# Patient Record
Sex: FEMALE | Race: WHITE | Employment: OTHER | URBAN - METROPOLITAN AREA
[De-identification: names, ages, dates, MRNs, and addresses within clinical notes are randomized per-mention and may not be internally consistent; named-entity substitution may affect disease eponyms.]

---

## 2018-07-13 ENCOUNTER — HOSPITAL ENCOUNTER (OUTPATIENT)
Dept: INFUSION THERAPY | Age: 68
Discharge: HOME | End: 2018-07-13
Payer: MEDICARE

## 2018-07-13 VITALS
TEMPERATURE: 97.9 F | DIASTOLIC BLOOD PRESSURE: 72 MMHG | OXYGEN SATURATION: 100 % | HEART RATE: 81 BPM | SYSTOLIC BLOOD PRESSURE: 139 MMHG | RESPIRATION RATE: 18 BRPM

## 2018-07-13 PROCEDURE — 96365 THER/PROPH/DIAG IV INF INIT: CPT

## 2018-07-13 PROCEDURE — 74011000258 HC RX REV CODE- 258

## 2018-07-13 PROCEDURE — 74011250636 HC RX REV CODE- 250/636

## 2018-07-13 RX ORDER — LEVOTHYROXINE SODIUM 112 UG/1
TABLET ORAL
COMMUNITY

## 2018-07-13 RX ORDER — PREDNISONE 5 MG/1
15 TABLET ORAL DAILY
COMMUNITY

## 2018-07-13 RX ORDER — PANTOPRAZOLE SODIUM 20 MG/1
20 TABLET, DELAYED RELEASE ORAL DAILY
COMMUNITY

## 2018-07-13 RX ORDER — FUROSEMIDE 80 MG/1
80 TABLET ORAL DAILY
COMMUNITY

## 2018-07-13 RX ORDER — LACTULOSE 10 G/15ML
30 SOLUTION ORAL; RECTAL DAILY
COMMUNITY

## 2018-07-13 RX ORDER — WARFARIN SODIUM 5 MG/1
5 TABLET ORAL EVERY EVENING
COMMUNITY

## 2018-07-13 RX ORDER — SPIRONOLACTONE 100 MG/1
100 TABLET, FILM COATED ORAL 2 TIMES DAILY
COMMUNITY

## 2018-07-13 RX ORDER — MYCOPHENOLATE MOFETIL 500 MG/1
1000 TABLET ORAL 2 TIMES DAILY
COMMUNITY

## 2018-07-13 RX ADMIN — SODIUM CHLORIDE 1 G: 900 INJECTION, SOLUTION INTRAVENOUS at 13:30

## 2018-07-13 NOTE — PROGRESS NOTES
1310 Pt arrived via wheelchair and in no distress for Ertepenem. Assessment complete. Pt reports overall weakness, especially BLE. Dual lumen PICC line in right upper arm, both lumens with brisk blood return. Patient Vitals for the past 12 hrs:   Temp Pulse Resp BP SpO2   07/13/18 1317 97.9 °F (36.6 °C) 81 18 139/72 100 %     Medications:  Ertepenem 1 gram IV    1400 Discharged home via whelchair and in no distress, accompanied by . Next appointment 07/14/18 @ [facility].

## 2018-07-14 ENCOUNTER — HOSPITAL ENCOUNTER (OUTPATIENT)
Dept: INFUSION THERAPY | Age: 68
Discharge: HOME | End: 2018-07-14
Payer: MEDICARE

## 2018-07-14 VITALS
HEART RATE: 83 BPM | DIASTOLIC BLOOD PRESSURE: 75 MMHG | TEMPERATURE: 98.6 F | SYSTOLIC BLOOD PRESSURE: 140 MMHG | RESPIRATION RATE: 18 BRPM

## 2018-07-14 PROCEDURE — 96365 THER/PROPH/DIAG IV INF INIT: CPT

## 2018-07-14 PROCEDURE — 74011000258 HC RX REV CODE- 258

## 2018-07-14 PROCEDURE — 74011250636 HC RX REV CODE- 250/636

## 2018-07-14 RX ORDER — SODIUM CHLORIDE 0.9 % (FLUSH) 0.9 %
10-40 SYRINGE (ML) INJECTION AS NEEDED
Status: ACTIVE | OUTPATIENT
Start: 2018-07-14 | End: 2018-07-15

## 2018-07-14 RX ORDER — HEPARIN 100 UNIT/ML
500 SYRINGE INTRAVENOUS AS NEEDED
Status: ACTIVE | OUTPATIENT
Start: 2018-07-14 | End: 2018-07-15

## 2018-07-14 RX ADMIN — SODIUM CHLORIDE 1 G: 900 INJECTION, SOLUTION INTRAVENOUS at 12:04

## 2018-07-14 RX ADMIN — Medication 30 ML: at 12:03

## 2018-07-14 RX ADMIN — SODIUM CHLORIDE, PRESERVATIVE FREE 500 UNITS: 5 INJECTION INTRAVENOUS at 12:33

## 2018-07-14 RX ADMIN — Medication 10 ML: at 12:33

## 2018-07-14 NOTE — PROGRESS NOTES
Pt arrived to [facility] ambulatory in no acute distress at 1200.  Assessment unremarkable. R arm PICC flushed without issue and positive blood return noted in each lumen. Visit Vitals    /75 (BP 1 Location: Left arm, BP Patient Position: Sitting)    Pulse 83    Temp 98.6 °F (37 °C)    Resp 18       The following medications administered:  Invanz IV over 30 minutes    Pt tolerated treatment well. PICC flushed per policy and new green caps placed.  Pt discharged ambulatory in no acute distress at 1235, accompanied by spouse. Next appointment 7/15/18 at 1200.

## 2018-07-15 ENCOUNTER — HOSPITAL ENCOUNTER (OUTPATIENT)
Dept: INFUSION THERAPY | Age: 68
Discharge: HOME | End: 2018-07-15
Payer: MEDICARE

## 2018-07-15 VITALS
RESPIRATION RATE: 18 BRPM | TEMPERATURE: 97.1 F | SYSTOLIC BLOOD PRESSURE: 144 MMHG | DIASTOLIC BLOOD PRESSURE: 80 MMHG | HEART RATE: 92 BPM

## 2018-07-15 PROCEDURE — 96365 THER/PROPH/DIAG IV INF INIT: CPT

## 2018-07-15 PROCEDURE — 74011000258 HC RX REV CODE- 258

## 2018-07-15 PROCEDURE — 74011250636 HC RX REV CODE- 250/636

## 2018-07-15 RX ADMIN — SODIUM CHLORIDE 1 G: 900 INJECTION, SOLUTION INTRAVENOUS at 11:40

## 2018-07-15 NOTE — PROGRESS NOTES
Outpatient Infusion Center Progress Note    1130 Pt admit to [facility] for Daily Invanz ambulatory with walker, in stable condition. Assessment completed. No new concerns voiced. Visit Vitals    /80 (BP 1 Location: Left arm, BP Patient Position: Sitting)    Pulse 92    Temp 97.1 °F (36.2 °C)    Resp 18       Medications:  Invanz 1 GM - infused over 30 minutes    PICC line flushed well with positive blood return in both lumens prior to and post infusion. Curos caps applied to end caps. PICC line secured to right arm with net dressing. 1215 Pt tolerated treatment well. D/c home ambulatory with walker, in no distress. Pt aware of next appointment scheduled for 07/16/18.

## 2018-07-16 ENCOUNTER — HOSPITAL ENCOUNTER (OUTPATIENT)
Dept: INFUSION THERAPY | Age: 68
Discharge: HOME | End: 2018-07-16
Payer: MEDICARE

## 2018-07-16 VITALS
TEMPERATURE: 98.6 F | SYSTOLIC BLOOD PRESSURE: 125 MMHG | HEART RATE: 68 BPM | DIASTOLIC BLOOD PRESSURE: 74 MMHG | RESPIRATION RATE: 16 BRPM

## 2018-07-16 PROCEDURE — 74011250636 HC RX REV CODE- 250/636

## 2018-07-16 PROCEDURE — 74011000258 HC RX REV CODE- 258

## 2018-07-16 PROCEDURE — 96365 THER/PROPH/DIAG IV INF INIT: CPT

## 2018-07-16 RX ORDER — HEPARIN 100 UNIT/ML
500 SYRINGE INTRAVENOUS AS NEEDED
Status: ACTIVE | OUTPATIENT
Start: 2018-07-16 | End: 2018-07-17

## 2018-07-16 RX ORDER — SODIUM CHLORIDE 0.9 % (FLUSH) 0.9 %
10 SYRINGE (ML) INJECTION AS NEEDED
Status: ACTIVE | OUTPATIENT
Start: 2018-07-16 | End: 2018-07-17

## 2018-07-16 RX ORDER — SODIUM CHLORIDE 9 MG/ML
25 INJECTION, SOLUTION INTRAVENOUS AS NEEDED
Status: DISPENSED | OUTPATIENT
Start: 2018-07-16 | End: 2018-07-17

## 2018-07-16 RX ADMIN — Medication 10 ML: at 13:45

## 2018-07-16 RX ADMIN — Medication 10 ML: at 13:50

## 2018-07-16 RX ADMIN — Medication 10 ML: at 13:44

## 2018-07-16 RX ADMIN — Medication 10 ML: at 14:24

## 2018-07-16 RX ADMIN — HEPARIN 500 UNITS: 100 SYRINGE at 14:24

## 2018-07-16 RX ADMIN — HEPARIN 500 UNITS: 100 SYRINGE at 13:50

## 2018-07-16 RX ADMIN — SODIUM CHLORIDE 1 G: 900 INJECTION, SOLUTION INTRAVENOUS at 13:45

## 2018-07-16 NOTE — PROGRESS NOTES
Outpatient Infusion Center Short Visit Progress Note    1330 Patient admitted to [place] for antibiotics ambulatory in stable condition. Assessment completed. No new concerns voiced. Visit Vitals    /74    Pulse 68    Temp 98.6 °F (37 °C)    Resp 16     No labs required today    PICC with positive blood return. Medications:  Ertapenem IV    1430 Patient tolerated treatment well. Patient discharged from [facility] ambulatory in no distress at 1430. Patient aware of next appointment scheduled for 7/17/18 at 1330.

## 2018-07-17 ENCOUNTER — HOSPITAL ENCOUNTER (OUTPATIENT)
Dept: INFUSION THERAPY | Age: 68
Discharge: HOME | End: 2018-07-17
Payer: MEDICARE

## 2018-07-17 VITALS — TEMPERATURE: 98.1 F | DIASTOLIC BLOOD PRESSURE: 68 MMHG | SYSTOLIC BLOOD PRESSURE: 119 MMHG | RESPIRATION RATE: 16 BRPM

## 2018-07-17 PROCEDURE — 74011250636 HC RX REV CODE- 250/636

## 2018-07-17 PROCEDURE — 96365 THER/PROPH/DIAG IV INF INIT: CPT

## 2018-07-17 PROCEDURE — 74011000258 HC RX REV CODE- 258

## 2018-07-17 RX ORDER — SODIUM CHLORIDE 0.9 % (FLUSH) 0.9 %
5-10 SYRINGE (ML) INJECTION AS NEEDED
Status: ACTIVE | OUTPATIENT
Start: 2018-07-17 | End: 2018-07-18

## 2018-07-17 RX ORDER — HEPARIN 100 UNIT/ML
500 SYRINGE INTRAVENOUS AS NEEDED
Status: ACTIVE | OUTPATIENT
Start: 2018-07-17 | End: 2018-07-18

## 2018-07-17 RX ADMIN — Medication 10 ML: at 13:41

## 2018-07-17 RX ADMIN — Medication 10 ML: at 13:40

## 2018-07-17 RX ADMIN — SODIUM CHLORIDE 1 G: 900 INJECTION, SOLUTION INTRAVENOUS at 13:47

## 2018-07-17 RX ADMIN — HEPARIN 500 UNITS: 100 SYRINGE at 14:19

## 2018-07-17 RX ADMIN — HEPARIN 500 UNITS: 100 SYRINGE at 14:18

## 2018-07-17 NOTE — PROGRESS NOTES
Problem: Knowledge Deficit  Goal: *Verbalizes understanding of procedures and medications  Outcome: Progressing Towards Goal  Pt receiving Ertapenem

## 2018-07-18 ENCOUNTER — HOSPITAL ENCOUNTER (OUTPATIENT)
Dept: INFUSION THERAPY | Age: 68
End: 2018-07-18
Payer: MEDICARE

## 2018-07-18 ENCOUNTER — HOSPITAL ENCOUNTER (OUTPATIENT)
Dept: INFUSION THERAPY | Age: 68
Discharge: HOME | End: 2018-07-18
Payer: MEDICARE

## 2018-07-18 VITALS
SYSTOLIC BLOOD PRESSURE: 124 MMHG | DIASTOLIC BLOOD PRESSURE: 72 MMHG | TEMPERATURE: 97.4 F | HEART RATE: 74 BPM | RESPIRATION RATE: 18 BRPM

## 2018-07-18 LAB
ALBUMIN SERPL-MCNC: 3.1 G/DL (ref 3.5–5)
ALBUMIN/GLOB SERPL: 1.2 {RATIO} (ref 1.1–2.2)
ALP SERPL-CCNC: 191 U/L (ref 45–117)
ALT SERPL-CCNC: 35 U/L (ref 12–78)
ANION GAP SERPL CALC-SCNC: 11 MMOL/L (ref 5–15)
AST SERPL-CCNC: 32 U/L (ref 15–37)
BASOPHILS # BLD: 0.1 K/UL (ref 0–0.1)
BASOPHILS NFR BLD: 1 % (ref 0–1)
BILIRUB SERPL-MCNC: 1.4 MG/DL (ref 0.2–1)
BUN SERPL-MCNC: 27 MG/DL (ref 6–20)
BUN/CREAT SERPL: 20 (ref 12–20)
CALCIUM SERPL-MCNC: 8.1 MG/DL (ref 8.5–10.1)
CHLORIDE SERPL-SCNC: 104 MMOL/L (ref 97–108)
CO2 SERPL-SCNC: 25 MMOL/L (ref 21–32)
CREAT SERPL-MCNC: 1.33 MG/DL (ref 0.55–1.02)
DIFFERENTIAL METHOD BLD: ABNORMAL
EOSINOPHIL # BLD: 0.1 K/UL (ref 0–0.4)
EOSINOPHIL NFR BLD: 1 % (ref 0–7)
ERYTHROCYTE [DISTWIDTH] IN BLOOD BY AUTOMATED COUNT: 20.1 % (ref 11.5–14.5)
GLOBULIN SER CALC-MCNC: 2.6 G/DL (ref 2–4)
GLUCOSE SERPL-MCNC: 103 MG/DL (ref 65–100)
HCT VFR BLD AUTO: 29.1 % (ref 35–47)
HGB BLD-MCNC: 8.8 G/DL (ref 11.5–16)
IMM GRANULOCYTES # BLD: 0.1 K/UL (ref 0–0.04)
IMM GRANULOCYTES NFR BLD AUTO: 1 % (ref 0–0.5)
LYMPHOCYTES # BLD: 0.5 K/UL (ref 0.8–3.5)
LYMPHOCYTES NFR BLD: 6 % (ref 12–49)
MCH RBC QN AUTO: 26.4 PG (ref 26–34)
MCHC RBC AUTO-ENTMCNC: 30.2 G/DL (ref 30–36.5)
MCV RBC AUTO: 87.4 FL (ref 80–99)
MONOCYTES # BLD: 1.1 K/UL (ref 0–1)
MONOCYTES NFR BLD: 12 % (ref 5–13)
NEUTS SEG # BLD: 6.9 K/UL (ref 1.8–8)
NEUTS SEG NFR BLD: 79 % (ref 32–75)
NRBC # BLD: 0 K/UL (ref 0–0.01)
NRBC BLD-RTO: 0 PER 100 WBC
PLATELET # BLD AUTO: 203 K/UL (ref 150–400)
PMV BLD AUTO: 11.2 FL (ref 8.9–12.9)
POTASSIUM SERPL-SCNC: 4.5 MMOL/L (ref 3.5–5.1)
PROT SERPL-MCNC: 5.7 G/DL (ref 6.4–8.2)
RBC # BLD AUTO: 3.33 M/UL (ref 3.8–5.2)
RBC MORPH BLD: ABNORMAL
RBC MORPH BLD: ABNORMAL
SODIUM SERPL-SCNC: 140 MMOL/L (ref 136–145)
WBC # BLD AUTO: 8.8 K/UL (ref 3.6–11)

## 2018-07-18 PROCEDURE — 96365 THER/PROPH/DIAG IV INF INIT: CPT

## 2018-07-18 PROCEDURE — 80053 COMPREHEN METABOLIC PANEL: CPT

## 2018-07-18 PROCEDURE — 36415 COLL VENOUS BLD VENIPUNCTURE: CPT

## 2018-07-18 PROCEDURE — 74011000258 HC RX REV CODE- 258

## 2018-07-18 PROCEDURE — 74011250636 HC RX REV CODE- 250/636

## 2018-07-18 PROCEDURE — 85025 COMPLETE CBC W/AUTO DIFF WBC: CPT

## 2018-07-18 RX ADMIN — SODIUM CHLORIDE 1 G: 900 INJECTION, SOLUTION INTRAVENOUS at 16:06

## 2018-07-18 NOTE — PROGRESS NOTES
1600 Pt admit to [facility] for [facility] ambulatory with walker in stable condition. Assessment completed. No new concerns voiced. PICC with positive blood return. Labs drawn per order and dressing changed. Patient tolerated well. Visit Vitals    /72    Pulse 74    Temp 97.4 °F (36.3 °C)    Resp 18       Medications:  Invanz    1645 Pt tolerated treatment well. PICC maintained positive blood return throughout treatment, flushed with positive blood return at conclusion and PICC heparinized and curos caps placed on end clave. D/c home ambulatory in no distress. Pt aware of next [facility] appointment scheduled for 7/19/18.

## 2018-07-19 ENCOUNTER — HOSPITAL ENCOUNTER (OUTPATIENT)
Dept: INFUSION THERAPY | Age: 68
Discharge: HOME | End: 2018-07-19
Payer: MEDICARE

## 2018-07-19 VITALS
RESPIRATION RATE: 18 BRPM | OXYGEN SATURATION: 100 % | DIASTOLIC BLOOD PRESSURE: 75 MMHG | TEMPERATURE: 97.8 F | HEART RATE: 78 BPM | SYSTOLIC BLOOD PRESSURE: 132 MMHG

## 2018-07-19 PROCEDURE — 74011250636 HC RX REV CODE- 250/636

## 2018-07-19 PROCEDURE — 96365 THER/PROPH/DIAG IV INF INIT: CPT

## 2018-07-19 PROCEDURE — 74011000258 HC RX REV CODE- 258

## 2018-07-19 PROCEDURE — 74011250636 HC RX REV CODE- 250/636: Performed by: ORTHOPAEDIC SURGERY

## 2018-07-19 RX ORDER — HEPARIN 100 UNIT/ML
500 SYRINGE INTRAVENOUS AS NEEDED
Status: ACTIVE | OUTPATIENT
Start: 2018-07-19 | End: 2018-07-20

## 2018-07-19 RX ORDER — SODIUM CHLORIDE 9 MG/ML
25 INJECTION, SOLUTION INTRAVENOUS AS NEEDED
Status: DISPENSED | OUTPATIENT
Start: 2018-07-19 | End: 2018-07-20

## 2018-07-19 RX ORDER — SODIUM CHLORIDE 0.9 % (FLUSH) 0.9 %
10 SYRINGE (ML) INJECTION AS NEEDED
Status: ACTIVE | OUTPATIENT
Start: 2018-07-19 | End: 2018-07-20

## 2018-07-19 RX ADMIN — SODIUM CHLORIDE 1 G: 900 INJECTION, SOLUTION INTRAVENOUS at 13:27

## 2018-07-19 RX ADMIN — Medication 10 ML: at 13:55

## 2018-07-19 RX ADMIN — HEPARIN 500 UNITS: 100 SYRINGE at 13:55

## 2018-07-19 RX ADMIN — Medication 10 ML: at 13:28

## 2018-07-19 RX ADMIN — HEPARIN 500 UNITS: 100 SYRINGE at 13:28

## 2018-07-19 RX ADMIN — Medication 10 ML: at 13:24

## 2018-07-19 NOTE — PROGRESS NOTES
Recent Results (from the past 24 hour(s))   CBC WITH AUTOMATED DIFF    Collection Time: 07/18/18  4:01 PM   Result Value Ref Range    WBC 8.8 3.6 - 11.0 K/uL    RBC 3.33 (L) 3.80 - 5.20 M/uL    HGB 8.8 (L) 11.5 - 16.0 g/dL    HCT 29.1 (L) 35.0 - 47.0 %    MCV 87.4 80.0 - 99.0 FL    MCH 26.4 26.0 - 34.0 PG    MCHC 30.2 30.0 - 36.5 g/dL    RDW 20.1 (H) 11.5 - 14.5 %    PLATELET 398 180 - 171 K/uL    MPV 11.2 8.9 - 12.9 FL    NRBC 0.0 0  WBC    ABSOLUTE NRBC 0.00 0.00 - 0.01 K/uL    NEUTROPHILS 79 (H) 32 - 75 %    LYMPHOCYTES 6 (L) 12 - 49 %    MONOCYTES 12 5 - 13 %    EOSINOPHILS 1 0 - 7 %    BASOPHILS 1 0 - 1 %    IMMATURE GRANULOCYTES 1 (H) 0.0 - 0.5 %    ABS. NEUTROPHILS 6.9 1.8 - 8.0 K/UL    ABS. LYMPHOCYTES 0.5 (L) 0.8 - 3.5 K/UL    ABS. MONOCYTES 1.1 (H) 0.0 - 1.0 K/UL    ABS. EOSINOPHILS 0.1 0.0 - 0.4 K/UL    ABS. BASOPHILS 0.1 0.0 - 0.1 K/UL    ABS. IMM. GRANS. 0.1 (H) 0.00 - 0.04 K/UL    DF SMEAR SCANNED      RBC COMMENTS ANISOCYTOSIS  2+        RBC COMMENTS OVALOCYTES  PRESENT       METABOLIC PANEL, COMPREHENSIVE    Collection Time: 07/18/18  4:01 PM   Result Value Ref Range    Sodium 140 136 - 145 mmol/L    Potassium 4.5 3.5 - 5.1 mmol/L    Chloride 104 97 - 108 mmol/L    CO2 25 21 - 32 mmol/L    Anion gap 11 5 - 15 mmol/L    Glucose 103 (H) 65 - 100 mg/dL    BUN 27 (H) 6 - 20 MG/DL    Creatinine 1.33 (H) 0.55 - 1.02 MG/DL    BUN/Creatinine ratio 20 12 - 20      GFR est AA 48 (L) >60 ml/min/1.73m2    GFR est non-AA 40 (L) >60 ml/min/1.73m2    Calcium 8.1 (L) 8.5 - 10.1 MG/DL    Bilirubin, total 1.4 (H) 0.2 - 1.0 MG/DL    ALT (SGPT) 35 12 - 78 U/L    AST (SGOT) 32 15 - 37 U/L    Alk.  phosphatase 191 (H) 45 - 117 U/L    Protein, total 5.7 (L) 6.4 - 8.2 g/dL    Albumin 3.1 (L) 3.5 - 5.0 g/dL    Globulin 2.6 2.0 - 4.0 g/dL    A-G Ratio 1.2 1.1 - 2.2

## 2018-07-19 NOTE — PROGRESS NOTES
Outpatient Infusion Center Short Visit Progress Note    1330 Patient admitted to [facility] for daily antibiotics ambulatory in stable condition. Assessment completed. No new concerns voiced. Visit Vitals    /75    Pulse 78    Temp 97.8 °F (36.6 °C)    Resp 18    SpO2 100%       Recent Results (from the past 24 hour(s))   CBC WITH AUTOMATED DIFF    Collection Time: 07/18/18  4:01 PM   Result Value Ref Range    WBC 8.8 3.6 - 11.0 K/uL    RBC 3.33 (L) 3.80 - 5.20 M/uL    HGB 8.8 (L) 11.5 - 16.0 g/dL    HCT 29.1 (L) 35.0 - 47.0 %    MCV 87.4 80.0 - 99.0 FL    MCH 26.4 26.0 - 34.0 PG    MCHC 30.2 30.0 - 36.5 g/dL    RDW 20.1 (H) 11.5 - 14.5 %    PLATELET 745 531 - 908 K/uL    MPV 11.2 8.9 - 12.9 FL    NRBC 0.0 0  WBC    ABSOLUTE NRBC 0.00 0.00 - 0.01 K/uL    NEUTROPHILS 79 (H) 32 - 75 %    LYMPHOCYTES 6 (L) 12 - 49 %    MONOCYTES 12 5 - 13 %    EOSINOPHILS 1 0 - 7 %    BASOPHILS 1 0 - 1 %    IMMATURE GRANULOCYTES 1 (H) 0.0 - 0.5 %    ABS. NEUTROPHILS 6.9 1.8 - 8.0 K/UL    ABS. LYMPHOCYTES 0.5 (L) 0.8 - 3.5 K/UL    ABS. MONOCYTES 1.1 (H) 0.0 - 1.0 K/UL    ABS. EOSINOPHILS 0.1 0.0 - 0.4 K/UL    ABS. BASOPHILS 0.1 0.0 - 0.1 K/UL    ABS. IMM. GRANS. 0.1 (H) 0.00 - 0.04 K/UL    DF SMEAR SCANNED      RBC COMMENTS ANISOCYTOSIS  2+        RBC COMMENTS OVALOCYTES  PRESENT       METABOLIC PANEL, COMPREHENSIVE    Collection Time: 07/18/18  4:01 PM   Result Value Ref Range    Sodium 140 136 - 145 mmol/L    Potassium 4.5 3.5 - 5.1 mmol/L    Chloride 104 97 - 108 mmol/L    CO2 25 21 - 32 mmol/L    Anion gap 11 5 - 15 mmol/L    Glucose 103 (H) 65 - 100 mg/dL    BUN 27 (H) 6 - 20 MG/DL    Creatinine 1.33 (H) 0.55 - 1.02 MG/DL    BUN/Creatinine ratio 20 12 - 20      GFR est AA 48 (L) >60 ml/min/1.73m2    GFR est non-AA 40 (L) >60 ml/min/1.73m2    Calcium 8.1 (L) 8.5 - 10.1 MG/DL    Bilirubin, total 1.4 (H) 0.2 - 1.0 MG/DL    ALT (SGPT) 35 12 - 78 U/L    AST (SGOT) 32 15 - 37 U/L    Alk.  phosphatase 191 (H) 45 - 117 U/L Protein, total 5.7 (L) 6.4 - 8.2 g/dL    Albumin 3.1 (L) 3.5 - 5.0 g/dL    Globulin 2.6 2.0 - 4.0 g/dL    A-G Ratio 1.2 1.1 - 2.2       2x PICC with positive blood return. Medications:  Ertapenum iv    Rn called MD clinic and reported creatine results, asked for ok to treat. ok to pull PICC on Saturday and pt requested cultures on Friday to be taken. Message left with inpatient clinic coordinator who is to deliver to MD.    1430 Patient tolerated treatment well. Patient discharged from [facility] ambulatory in no distress at 1430.  Patient aware of next appointment scheduled for 7/19/18

## 2018-07-20 ENCOUNTER — HOSPITAL ENCOUNTER (OUTPATIENT)
Dept: INFUSION THERAPY | Age: 68
Discharge: HOME | End: 2018-07-20
Payer: MEDICARE

## 2018-07-20 VITALS
SYSTOLIC BLOOD PRESSURE: 147 MMHG | DIASTOLIC BLOOD PRESSURE: 67 MMHG | HEART RATE: 93 BPM | RESPIRATION RATE: 18 BRPM | OXYGEN SATURATION: 98 % | TEMPERATURE: 96.9 F

## 2018-07-20 PROCEDURE — 74011000258 HC RX REV CODE- 258

## 2018-07-20 PROCEDURE — 74011250636 HC RX REV CODE- 250/636

## 2018-07-20 PROCEDURE — 96365 THER/PROPH/DIAG IV INF INIT: CPT

## 2018-07-20 RX ORDER — SODIUM CHLORIDE 0.9 % (FLUSH) 0.9 %
5-10 SYRINGE (ML) INJECTION AS NEEDED
Status: ACTIVE | OUTPATIENT
Start: 2018-07-20 | End: 2018-07-21

## 2018-07-20 RX ORDER — HEPARIN 100 UNIT/ML
500 SYRINGE INTRAVENOUS AS NEEDED
Status: ACTIVE | OUTPATIENT
Start: 2018-07-20 | End: 2018-07-21

## 2018-07-20 RX ADMIN — Medication 10 ML: at 13:21

## 2018-07-20 RX ADMIN — Medication 10 ML: at 13:50

## 2018-07-20 RX ADMIN — HEPARIN 500 UNITS: 100 SYRINGE at 13:50

## 2018-07-20 RX ADMIN — SODIUM CHLORIDE 1 G: 900 INJECTION, SOLUTION INTRAVENOUS at 13:20

## 2018-07-20 RX ADMIN — HEPARIN 500 UNITS: 100 SYRINGE at 13:21

## 2018-07-20 RX ADMIN — Medication 10 ML: at 13:20

## 2018-07-20 NOTE — PROGRESS NOTES
hospitals Progress Note    Date: 2018    Name: Jersey Snell    MRN: [id number]         : 1950    Ms. Arianna Lang Arrived ambulatory and in no distress for Daily Invanz. Assessment was completed, no acute issues at this time, no new complaints voiced. RUE PICC line with + blood return to both lumens. 1330. Patient reports she thought she was suppose to have blood cultures done at end of treatment. No order scanned into chart. Spoke to Ata Villafuerte from MD office regarding patient's concern. Per Ata Villafuerte MD's nurse will call hospitals today to yenni. Patient Vitals for the past 12 hrs:   Temp Pulse Resp BP SpO2   18 1311 96.9 °F (36.1 °C) 93 18 147/67 98 %       Medications:  Invanz    1350. Ms. Arianna Lang tolerated treatment well and was discharged from [place] [address] in stable condition at. She is to return on 18 for her next appointment. Addendum:  No call received from MD office regarding blood cultures.   Patient has follow up with MD office on Monday AM.     Ricco Ruiz RN  2018

## 2018-07-21 ENCOUNTER — HOSPITAL ENCOUNTER (OUTPATIENT)
Dept: INFUSION THERAPY | Age: 68
Discharge: HOME | End: 2018-07-21
Payer: MEDICARE

## 2018-07-21 VITALS
DIASTOLIC BLOOD PRESSURE: 64 MMHG | SYSTOLIC BLOOD PRESSURE: 124 MMHG | HEART RATE: 72 BPM | RESPIRATION RATE: 18 BRPM | TEMPERATURE: 96.9 F | OXYGEN SATURATION: 98 %

## 2018-07-21 PROCEDURE — 74011000258 HC RX REV CODE- 258

## 2018-07-21 PROCEDURE — 74011250636 HC RX REV CODE- 250/636

## 2018-07-21 PROCEDURE — 99212 OFFICE O/P EST SF 10 MIN: CPT

## 2018-07-21 PROCEDURE — 96365 THER/PROPH/DIAG IV INF INIT: CPT

## 2018-07-21 RX ORDER — HEPARIN 100 UNIT/ML
500 SYRINGE INTRAVENOUS AS NEEDED
Status: ACTIVE | OUTPATIENT
Start: 2018-07-21 | End: 2018-07-22

## 2018-07-21 RX ORDER — SODIUM CHLORIDE 0.9 % (FLUSH) 0.9 %
10-40 SYRINGE (ML) INJECTION AS NEEDED
Status: ACTIVE | OUTPATIENT
Start: 2018-07-21 | End: 2018-07-22

## 2018-07-21 RX ADMIN — Medication 10 ML: at 08:33

## 2018-07-21 RX ADMIN — SODIUM CHLORIDE 1 G: 900 INJECTION, SOLUTION INTRAVENOUS at 08:35

## 2018-07-21 NOTE — PROGRESS NOTES
[facility] short consult note:    0830  Pt arrived to [facility] ambulatory and in no distress for Abx. Denies any new complaints. PICC intact with positive blood return. Pt last dose scheduled today then PICC removal. Pt reports seeing MD this coming Monday. Patient Vitals for the past 4 hrs:   Temp Pulse Resp BP SpO2   07/21/18 0950 - 72 - 124/64 -   07/21/18 0831 96.9 °F (36.1 °C) 69 18 133/71 98 %         Medication given:  Invanz      0910 PICC line removed. Dressing applied. Pt monitored x 30 minutes. Dsg checked post 30 minute removal is dry and intact. VSS. Discharge instructions given. [id number] Discharged home ambulatory and in no distress. Tolerated procedure well. Treatment completed. No further appointments scheduled.

## 2022-08-29 ENCOUNTER — HOSPITAL ENCOUNTER (OUTPATIENT)
Dept: GENERAL RADIOLOGY | Age: 72
Discharge: HOME | End: 2022-08-29
Attending: INTERNAL MEDICINE
Payer: MEDICARE

## 2022-08-29 ENCOUNTER — TRANSCRIBE ORDER (OUTPATIENT)
Dept: GENERAL RADIOLOGY | Age: 72
End: 2022-08-29

## 2022-08-29 DIAGNOSIS — R06.09 DYSPNEA ON EXERTION: ICD-10-CM

## 2022-08-29 DIAGNOSIS — R06.09 DYSPNEA ON EXERTION: Primary | ICD-10-CM

## 2022-08-29 PROCEDURE — 71046 X-RAY EXAM CHEST 2 VIEWS: CPT

## 2022-11-29 ENCOUNTER — OFFICE VISIT (OUTPATIENT)
Dept: NEUROLOGY | Age: 72
End: 2022-11-29

## 2022-11-29 DIAGNOSIS — M79.672 LEFT FOOT PAIN: Primary | ICD-10-CM

## 2022-11-29 DIAGNOSIS — G62.9 NEUROPATHY: ICD-10-CM

## 2022-11-29 NOTE — PROGRESS NOTES
EMG/ NCS Report  [facility]  - [facility]  [address] [facility], [address] [place] [address]   Ph: [phone]   FAX: [phone]/ [phone]  Test Date:  2019      Test Date:  2022    Patient: Alayna Garcia :  Physician: Keegan Villagomez MD   Sex: Female Height: ' \" Ref Carlos Hogue   ID#: [id number] Weight:  lbs. Technician: Vance Diaz     Patient History / Exam:  CC:LT. Foot numbness for many yrsnow worsening. EMG & NCV Findings:  Evaluation of the left Fibular motor nerve showed prolonged distal onset latency (9.5 ms), reduced amplitude (0.1 mV), normal conduction velocity (B Fib-Ankle, 41 m/s), and decreased conduction velocity (Poplt-B Fib, 15 m/s). The left Fibular TA motor nerve showed normal distal onset latency (Fib Head, 3.8 ms), normal amplitude (4.0 mV), prolonged distal onset latency (Poplit, 5.8 ms), and normal conduction velocity (Poplit-Fib Head, 50 m/s). The left tibial motor nerve showed normal distal onset latency (4.5 ms), normal amplitude (1.5 mV), and normal conduction velocity (Knee-Ankle, 46 m/s). The left Sup Fibular sensory and the left sural sensory nerves showed normal distal peak latency (L3.0, L3.4 ms) and normal amplitude (L4.9, L9.5 µV). F Wave studies indicate that the left tibial F wave has prolonged latency (56.14 ms). Needle evaluation of the left abductor hallucis, the left abductor digiti minimi, and the left extensor digitorum brevis muscles showed diminished recruitment. All remaining muscles (as indicated in the following table) showed no evidence of electrical instability.         Impression:        ___________________________  Georgi Guerra IV, MD      Nerve Conduction Studies  Anti Sensory Summary Table     Stim Site NR Peak (ms) Norm Peak (ms) P-T Amp (µV) Norm P-T Amp Site1 Site2 Dist (cm)   Left Sup Fibular Anti Sensory (Lat ankle)  31 °C   Lower leg    3.0 <4.6 4.9 >4 Lower leg Lat ankle 10.0   Site 2    2.9  5.6       Site 3    2.9  4.9       Left Sural Anti Sensory (Lat Mall)  31 °C   Calf    3.4 <4.5 9.5 >4.0 Calf Lat Mall 14.0   Site 2    3.7  2.3       Site 3    3.5  3.4         Ortho Sensory Summary Table     Stim Site NR Peak (ms) P-T Amp (µV) Site1 Site2 Dist (cm) Alcides (m/s)   Left Lateral Plantar Ortho Sensory (Med Malleolus)  30.5 °C   Digit 5    5.5 6.0 Digit 5 Med Malleolus 130.0 236   Site 2    6.0 0.8       Site 3    6.2 5.8       Site 4    6.0 2.3       Right Lateral Plantar Ortho Sensory (Med Malleolus)  31 °C   Digit 5    3.8 41.4 Digit 5 Med Malleolus 130.0 342   Site 2    3.8 20.9       Site 3    3.8 15.4           3.6 40.7       Left Medial Plantar Ortho Sensory (Med Malleolus)  30.7 °C   Digit 1    4.3 4.3 Digit 1 Med Malleolus 11.0 26   Site 2    4.3 5.4       Site 3    4.1 4.9       Right Medial Plantar Ortho Sensory (Med Malleolus)  31.3 °C   Digit 1    5.1 18.7 Digit 1 Med Malleolus 11.0 22   Site 2    4.8 6.0       Site 3    4.6 4.6           5.5 9.5         Motor Summary Table     Stim Site NR Onset (ms) Norm Onset (ms) O-P Amp (mV) Norm O-P Amp Amp (Prev) (%) Site1 Site2 Dist (cm) Alcides (m/s) Norm Alcides (m/s)   Left Fibular Motor (Ext Dig Brev)  31 °C   Ankle    9.5 <6.5 0.1 >1.1 100.0 Ankle Ext Dig Brev 8.0     B Fib    17.6  0.2  200.0 B Fib Ankle 33.0 41 >38   Poplt    24.4  0.1  50.0 Poplt B Fib 10.0 15 >42   Left Fibular TA Motor (Tib Ant)  30.9 °C   Fib Head    3.8 <4.5 4.0 >3.0 100.0 Fib Head Tib Ant 10.0     Poplit    5.8 <5.7 3.0  75.0 Poplit Fib Head 10.0 50 >40   Left Tibial Motor (Abd Jessica Brev)  30.5 °C   Ankle    4.5 <6.1 1.5 >1.1 100.0 Ankle Abd Jessica Brev 8.0     Knee    12.8  1.3  86.7 Knee Ankle 38.0 46 >39     F Wave Studies     NR F-Lat (ms) Lat Norm (ms) L-R F-Lat (ms) L-R Lat Norm   Left Tibial (Mrkrs) (Abd Hallucis)  29.4 °C      56.14 <56  <5.7     H Reflex Studies     NR H-Lat (ms) L-R H-Lat (ms) L-R Lat Norm   Left Tibial (Gastroc)  30.5 °C      40.00  <2.0     EMG     Side Muscle Nerve Root Ins Act Fibs Psw Recrt Duration Amp Poly Comment   Left AbdHallucis MedPlantar S1-2 Nml Nml Nml Reduced Nml Nml Nml    Left ABD Dig Min Ulnar C8-T1 Nml Nml Nml Reduced Nml Nml Nml    Left Ext Dig Brev Dp Br Peron L5, S1 Nml Nml Nml Reduced Nml Nml Nml    Left MedGastroc Tibial S1-2 Nml Nml Nml Nml Nml Nml Nml                Nerve Conduction Studies  Anti Sensory Left/Right Comparison     Stim Site L Lat (ms) R Lat (ms) L-R Lat (ms) L Amp (µV) R Amp (µV) L-R Amp (%) Site1 Site2 L Alcides (m/s) R Alcides (m/s) L-R Alcides (m/s)   Sup Fibular Anti Sensory (Lat ankle)  31 °C   Lower leg 2.8   4.9   Lower leg Lat ankle 36     Site 2 2.5   5.6          Site 3 2.3   4.9          Sural Anti Sensory (Lat Mall)  31 °C   Calf 3.0   9.5   Calf Lat Mall 47     Site 2 3.1   2.3          Site 3 3.0   3.4            Ortho Sensory Left/Right Comparison     Stim Site L Lat (ms) R Lat (ms) L-R Lat (ms) L Amp (µV) R Amp (µV) L-R Amp (%) Site1 Site2 L Alcides (m/s) R Alcides (m/s) L-R Alcides (m/s)   Lateral Plantar Ortho Sensory (Med Malleolus)  30.5 °C   Digit 5 4.7 3.0 1.7 6.0 41.4 85.5 Digit 5 Med Malleolus 236 342 106   Site 2 5.5 3.0 2.5 0.8 20.9 96.2        Site 3 5.8 3.0 2.8 5.8 15.4 62.3        Site 4 5.5 2.6 2.9 2.3 40.7 94.3        Medial Plantar Ortho Sensory (Med Malleolus)  30.7 °C   Digit 1 3.6 3.9 0.3 4.3 18.7 77.0 Digit 1 Med Malleolus 26 22 4   Site 2 3.8 4.2 0.4 5.4 6.0 10.0        Site 3 3.2 3.2 0.0 4.9 4.6 6.1          4.1   9.5           Motor Left/Right Comparison     Stim Site L Lat (ms) R Lat (ms) L-R Lat (ms) L Amp (mV) R Amp (mV) L-R Amp (%) Site1 Site2 L Alcides (m/s) R Alcides (m/s) L-R Alcides (m/s)   Fibular Motor (Ext Dig Brev)  31 °C   Ankle 9.5   0.1   Ankle Ext Dig Brev      B Fib 17.6   0.2   B Fib Ankle 41     Poplt 24.4   0.1   Poplt B Fib 15     Fibular TA Motor (Tib Ant)  30.9 °C   Fib Head 3.8   4.0   Fib Head Tib Ant      Poplit 5.8   3.0   Poplit Fib Head 50     Tibial Motor (Abd Jessica Brev)  30.5 °C   Ankle 4.5   1.5   Ankle Abd Jessica Brev      Knee 12.8   1.3   Knee Ankle 46           Waveforms:

## 2022-11-29 NOTE — PROGRESS NOTES
This was an elective EMG and nerve conduction of patient's left lower extremity. Concerns went to establish left foot pain for some time now. Patient history. From what I know patient has had left foot pain for a number of years and her medical history calling included reference to gout and left foot bone fusion. Went to a local neuropathy evaluation place and was told she did not have neuropathy. I believe this is her first formal EMG and nerve conduction assessment. No background history of diabetes that she is aware of. EMG and nerve conduction findings. 1.  Needle insertion and probing was uniformly normal.  No evidence of acute denervation or chronic denervation/reinnervation or myopathic potentials. The intrinsic foot muscles were uncomfortable for the patient but she was able to follow suggestions accordingly. Did have limited toe mobility and this may go to primary arthritic implications? There was diminished recruitment of the intrinsic foot muscles as it went to the abductor hallucis, the abductor digiti quinti minimi and the extensor digitorum brevis. The EDB muscle was very atretic. The upstream muscles of the tibialis anterior and medial gastrocs were normal in terms of insertional activity, recruitment and motor unit activation potential etc.    2.  Nerve conduction assessment: Showed normal sensory assessment including tarsal tunnel comparative's right and left foot. The left peroneal to EDB response was abnormal but again the EDB was a very atretic muscle. The tibial response however was normal.    Impression: This study did not reveal convincing denervation features. If the patient demonstrates a significant low back pain component, as in sciatica, an MRI of the low back is probably not a bad investment for a case like this. Clinical correlation is advised.   N[place] WYMAN.